# Patient Record
Sex: FEMALE | Race: BLACK OR AFRICAN AMERICAN | URBAN - METROPOLITAN AREA
[De-identification: names, ages, dates, MRNs, and addresses within clinical notes are randomized per-mention and may not be internally consistent; named-entity substitution may affect disease eponyms.]

---

## 2017-03-02 ENCOUNTER — HOSPITAL ENCOUNTER (OUTPATIENT)
Dept: MAMMOGRAPHY | Age: 52
Discharge: HOME | End: 2017-03-02
Attending: FAMILY MEDICINE | Admitting: FAMILY MEDICINE

## 2017-03-02 DIAGNOSIS — Z12.31 VISIT FOR SCREENING MAMMOGRAM: ICD-10-CM

## 2018-11-27 ENCOUNTER — OFFICE VISIT (OUTPATIENT)
Dept: ENT CLINIC | Age: 53
End: 2018-11-27
Payer: COMMERCIAL

## 2018-11-27 VITALS
SYSTOLIC BLOOD PRESSURE: 129 MMHG | WEIGHT: 160.2 LBS | HEIGHT: 63 IN | DIASTOLIC BLOOD PRESSURE: 67 MMHG | BODY MASS INDEX: 28.39 KG/M2 | HEART RATE: 75 BPM

## 2018-11-27 DIAGNOSIS — J30.2 SEASONAL ALLERGIC RHINITIS, UNSPECIFIED TRIGGER: Chronic | ICD-10-CM

## 2018-11-27 DIAGNOSIS — J32.9 CHRONIC SINUSITIS, UNSPECIFIED LOCATION: Primary | ICD-10-CM

## 2018-11-27 PROBLEM — N95.1 SYMPTOMATIC MENOPAUSAL OR FEMALE CLIMACTERIC STATES: Status: ACTIVE | Noted: 2018-11-27

## 2018-11-27 PROCEDURE — 99243 OFF/OP CNSLTJ NEW/EST LOW 30: CPT | Performed by: OTOLARYNGOLOGY

## 2018-11-27 RX ORDER — IBUPROFEN 800 MG/1
800 TABLET ORAL
COMMUNITY
Start: 2018-08-06

## 2018-11-27 RX ORDER — ESCITALOPRAM OXALATE 10 MG/1
10 TABLET ORAL
COMMUNITY

## 2018-11-27 RX ORDER — FLUTICASONE PROPIONATE 50 MCG
2 SPRAY, SUSPENSION (ML) NASAL
COMMUNITY
Start: 2018-01-10

## 2018-11-27 NOTE — PROGRESS NOTES
[address] [facility] ENT       NEW PATIENT VISIT    PCP:  Dylan Uirbe MD    REFERRED BY:   Dr. Ladonna Mcadams:  Chief Complaint   Patient presents with    Sinusitis       HISTORY OF PRESENT ILLNESS:       (Location, Quality, Severity, Duration, Timing, Context, Modifying factors, Associated symptoms)  Patrizia Scherer is a 48 y.o. female here for evaluation of a problem located in the sinuses. The quality is recurrent sinus infections. The severity is severe. The duration is about 4 months. The timing is off and on. It recurs about once a month. Usually treated with an anitibiotic and \"it clears up and then comes right back. \"  Prior to 4 months ago, had sinus infections every two months, treated with AB going on since about 2018. Dr. Agustin Peguero has treated with Z-derek for all episodes. She has environmental allergies, for which she uses Flonase. REVIEW OF SYSTEMS:    CONSTITUTIONAL:  Denied fever. Denied unexplained weight loss. EARS, NOSE, THROAT:  (+) otalgia, both, 7/10, hurt right now.  (+) ringing and buzzing tinnitus, off and on once week, lasting about 30 minutes. (+) hoarseness off and on clears up, never longer than a few days. Denied otorrhea, hearing loss,  nasal dyspnea, rhinorrhea, sore throat and chronic hoarseness. PAST MEDICAL HISTORY:    Past Medical History:   Diagnosis Date    GERD (gastroesophageal reflux disease)     Nausea & vomiting     PVC (premature ventricular contraction)        Past Surgical History:   Procedure Laterality Date     SECTION      x1    HYSTERECTOMY      SALPINGO-OOPHORECTOMY Right 13    lap         EXAMINATION:    VITALS SIGNS reviewed. Vitals:    18 1520   BP: 129/67   Site: Left Lower Arm   Pulse: 75   Weight: 160 lb 3.2 oz (72.7 kg)   Height: 5' 3\" (1.6 m)     GENERAL APPEARANCE:  Well developed, well-nourished, and in no acute distress.    ABILITY TO COMMUNICATE/QUALITY OF VOICE: Able to communicate normally. Voice was normal with no hoarseness or hot potato voice. SALIVARY GLANDS:  The parotid, submandibular, and sublingual glands appeared to be normal bilaterally. INSPECTION, HEAD AND FACE:  Normal with no masses, tenderness, or scars. EYES:  The extraocular motions were intact bilaterally. Primary gaze appeared to be normal bilaterally. FACIAL STRENGTH, MOTION:  Facial nerve function was intact, normal, and equal bilaterally for all 5 branches. SINUSES:  The frontal and maxillary sinuses were nontender, bilaterally. (+) HEARING ASSESSMENT:  Pineda lateralized to the right ear. Rinne showed air conduction greater than bone conduction bilaterally, with a 512 Hz tuning fork. Was able to hear finger rub at the external meatus bilaterally. EXTERNAL EAR/NOSE:  The pinnae, mastoids, and external nose appeared to be normal bilaterally. EARS, OTOSCOPY: Tympanic membrane and external auditory canal appeared to be normal bilaterally    NOSE:  The nasal septum was midline. The inferior nasal turbinates were normal bilaterally. The nasal mucosa and nasal secretions appeared to be normal bilaterally. LIPS, TEETH AND GUMS:  Normal.  OROPHARYNX/ORAL CAVITY:  The oral cavity and oropharynx appeared to be normal bilaterally, with no mucosal lesions or structural deformities or abnormalities. The tongue was normal bilaterally. The palatine tonsils were normal bilaterally. NECK:  Normal with no masses or tenderness bilaterally. The laryngeal cartilages and hyoid bone were normal.  The trachea was midline. THYROID:  Normal with no masses or tenderness or goiter bilaterally. LYMPH NODES, CERVICAL, FACIAL AND SUPRACLAVICULAR:  No lymphadenopathy detected bilaterally. IMPRESSION / [place]  / ORDERS:       Alexander Mejia was seen today for sinusitis.     Diagnoses and all orders for this visit:    Chronic sinusitis, unspecified location  -     CT Sinus WO Contrast; Future

## 2018-12-10 ENCOUNTER — TELEPHONE (OUTPATIENT)
Dept: ENT CLINIC | Age: 53
End: 2018-12-10

## 2018-12-11 ENCOUNTER — TELEPHONE (OUTPATIENT)
Dept: ENT CLINIC | Age: 53
End: 2018-12-11

## 2018-12-12 ENCOUNTER — TELEPHONE (OUTPATIENT)
Dept: ENT CLINIC | Age: 53
End: 2018-12-12

## 2018-12-13 NOTE — TELEPHONE ENCOUNTER
[phone] ([place])   [facility] for patient to call back Dr. España [facility] office [phone].

## 2019-01-07 ENCOUNTER — OFFICE VISIT (OUTPATIENT)
Dept: ENT CLINIC | Age: 54
End: 2019-01-07
Payer: COMMERCIAL

## 2019-01-07 VITALS
BODY MASS INDEX: 27.55 KG/M2 | SYSTOLIC BLOOD PRESSURE: 112 MMHG | HEART RATE: 78 BPM | WEIGHT: 155.5 LBS | DIASTOLIC BLOOD PRESSURE: 63 MMHG | HEIGHT: 63 IN

## 2019-01-07 DIAGNOSIS — J32.2 CHRONIC ETHMOIDAL SINUSITIS: Primary | ICD-10-CM

## 2019-01-07 DIAGNOSIS — R51.9 NONINTRACTABLE EPISODIC HEADACHE, UNSPECIFIED HEADACHE TYPE: ICD-10-CM

## 2019-01-07 DIAGNOSIS — G43.009 MIGRAINE WITHOUT AURA AND WITHOUT STATUS MIGRAINOSUS, NOT INTRACTABLE: ICD-10-CM

## 2019-01-07 PROCEDURE — 31231 NASAL ENDOSCOPY DX: CPT | Performed by: OTOLARYNGOLOGY

## 2019-01-07 RX ORDER — SUMATRIPTAN 100 MG/1
100 TABLET, FILM COATED ORAL
Qty: 9 TABLET | Refills: 2 | Status: SHIPPED | OUTPATIENT
Start: 2019-01-07 | End: 2019-03-11

## 2019-01-18 ENCOUNTER — TELEPHONE (OUTPATIENT)
Dept: ENT CLINIC | Age: 54
End: 2019-01-18

## 2019-03-11 ENCOUNTER — OFFICE VISIT (OUTPATIENT)
Dept: ENT CLINIC | Age: 54
End: 2019-03-11
Payer: COMMERCIAL

## 2019-03-11 VITALS
HEIGHT: 63 IN | HEART RATE: 74 BPM | SYSTOLIC BLOOD PRESSURE: 112 MMHG | WEIGHT: 151 LBS | DIASTOLIC BLOOD PRESSURE: 64 MMHG | BODY MASS INDEX: 26.75 KG/M2

## 2019-03-11 DIAGNOSIS — J01.90 ACUTE RHINOSINUSITIS: Primary | ICD-10-CM

## 2019-03-11 DIAGNOSIS — R51.9 NONINTRACTABLE EPISODIC HEADACHE, UNSPECIFIED HEADACHE TYPE: ICD-10-CM

## 2019-03-11 DIAGNOSIS — J32.2 CHRONIC ETHMOIDAL SINUSITIS: ICD-10-CM

## 2019-03-11 PROCEDURE — 99213 OFFICE O/P EST LOW 20 MIN: CPT | Performed by: OTOLARYNGOLOGY
